# Patient Record
Sex: MALE | Race: WHITE | NOT HISPANIC OR LATINO | Employment: FULL TIME | URBAN - METROPOLITAN AREA
[De-identification: names, ages, dates, MRNs, and addresses within clinical notes are randomized per-mention and may not be internally consistent; named-entity substitution may affect disease eponyms.]

---

## 2017-06-22 ENCOUNTER — APPOINTMENT (OUTPATIENT)
Dept: URGENT CARE | Facility: MEDICAL CENTER | Age: 67
End: 2017-06-22

## 2017-06-22 ENCOUNTER — APPOINTMENT (OUTPATIENT)
Dept: LAB | Facility: MEDICAL CENTER | Age: 67
End: 2017-06-22

## 2017-06-22 ENCOUNTER — TRANSCRIBE ORDERS (OUTPATIENT)
Dept: URGENT CARE | Facility: MEDICAL CENTER | Age: 67
End: 2017-06-22

## 2017-06-22 DIAGNOSIS — Z02.1 PRE-EMPLOYMENT HEALTH SCREENING EXAMINATION: ICD-10-CM

## 2017-06-22 DIAGNOSIS — Z02.1 PRE-EMPLOYMENT HEALTH SCREENING EXAMINATION: Primary | ICD-10-CM

## 2017-06-22 LAB — RUBV IGG SERPL IA-ACNC: >175 IU/ML

## 2017-06-22 PROCEDURE — 86480 TB TEST CELL IMMUN MEASURE: CPT

## 2017-06-22 PROCEDURE — 86787 VARICELLA-ZOSTER ANTIBODY: CPT

## 2017-06-22 PROCEDURE — 86765 RUBEOLA ANTIBODY: CPT

## 2017-06-22 PROCEDURE — 86735 MUMPS ANTIBODY: CPT

## 2017-06-22 PROCEDURE — 36415 COLL VENOUS BLD VENIPUNCTURE: CPT

## 2017-06-22 PROCEDURE — 86762 RUBELLA ANTIBODY: CPT

## 2017-06-24 LAB
ANNOTATION COMMENT IMP: ABNORMAL
GAMMA INTERFERON BACKGROUND BLD IA-ACNC: 0.1 IU/ML
M TB IFN-G BLD-IMP: POSITIVE
M TB IFN-G CD4+ BCKGRND COR BLD-ACNC: >10 IU/ML
M TB IFN-G CD4+ T-CELLS BLD-ACNC: >10 IU/ML
MITOGEN IGNF BLD-ACNC: 3.76 IU/ML
QUANTIFERON-TB GOLD IN TUBE: NORMAL
SERVICE CMNT-IMP: ABNORMAL

## 2017-06-27 LAB
MEV IGG SER QL: NORMAL
MUV IGG SER QL: NORMAL
VZV IGG SER IA-ACNC: NORMAL

## 2017-06-29 ENCOUNTER — TRANSCRIBE ORDERS (OUTPATIENT)
Dept: ADMINISTRATIVE | Facility: HOSPITAL | Age: 67
End: 2017-06-29

## 2017-06-29 ENCOUNTER — APPOINTMENT (OUTPATIENT)
Dept: RADIOLOGY | Facility: MEDICAL CENTER | Age: 67
End: 2017-06-29

## 2017-06-29 DIAGNOSIS — R76.11 POSITIVE TB TEST: ICD-10-CM

## 2017-06-29 DIAGNOSIS — R76.11 POSITIVE TB TEST: Primary | ICD-10-CM

## 2017-06-29 PROCEDURE — 71010 HB CHEST X-RAY 1 VIEW FRONTAL: CPT

## 2018-05-08 ENCOUNTER — HOSPITAL ENCOUNTER (EMERGENCY)
Facility: HOSPITAL | Age: 68
Discharge: HOME | End: 2018-05-08
Attending: EMERGENCY MEDICINE | Admitting: EMERGENCY MEDICINE
Payer: COMMERCIAL

## 2018-05-08 ENCOUNTER — APPOINTMENT (EMERGENCY)
Dept: CT IMAGING | Facility: HOSPITAL | Age: 68
End: 2018-05-08
Payer: COMMERCIAL

## 2018-05-08 VITALS
TEMPERATURE: 98.1 F | WEIGHT: 177 LBS | BODY MASS INDEX: 29.45 KG/M2 | DIASTOLIC BLOOD PRESSURE: 89 MMHG | RESPIRATION RATE: 18 BRPM | OXYGEN SATURATION: 96 % | SYSTOLIC BLOOD PRESSURE: 185 MMHG | HEART RATE: 70 BPM

## 2018-05-08 DIAGNOSIS — R10.9 RIGHT FLANK PAIN: Primary | ICD-10-CM

## 2018-05-08 DIAGNOSIS — N23 RENAL COLIC ON RIGHT SIDE: ICD-10-CM

## 2018-05-08 LAB
ALBUMIN SERPL BCP-MCNC: 4 G/DL (ref 3.5–5)
ALP SERPL-CCNC: 84 U/L (ref 46–116)
ALT SERPL W P-5'-P-CCNC: 27 U/L (ref 12–78)
ANION GAP SERPL CALCULATED.3IONS-SCNC: 8 MMOL/L (ref 4–13)
APTT PPP: 27 SECONDS (ref 23–35)
AST SERPL W P-5'-P-CCNC: 19 U/L (ref 5–45)
BACTERIA UR QL AUTO: ABNORMAL /HPF
BASOPHILS # BLD AUTO: 0.02 THOUSANDS/ΜL (ref 0–0.1)
BASOPHILS NFR BLD AUTO: 0 % (ref 0–1)
BILIRUB SERPL-MCNC: 0.98 MG/DL (ref 0.2–1)
BILIRUB UR QL STRIP: ABNORMAL
BUN SERPL-MCNC: 21 MG/DL (ref 5–25)
CALCIUM SERPL-MCNC: 9 MG/DL (ref 8.3–10.1)
CHLORIDE SERPL-SCNC: 102 MMOL/L (ref 100–108)
CLARITY UR: ABNORMAL
CO2 SERPL-SCNC: 28 MMOL/L (ref 21–32)
COLOR UR: ABNORMAL
CREAT SERPL-MCNC: 1.37 MG/DL (ref 0.6–1.3)
EOSINOPHIL # BLD AUTO: 0.11 THOUSAND/ΜL (ref 0–0.61)
EOSINOPHIL NFR BLD AUTO: 1 % (ref 0–6)
ERYTHROCYTE [DISTWIDTH] IN BLOOD BY AUTOMATED COUNT: 13.8 % (ref 11.6–15.1)
GFR SERPL CREATININE-BSD FRML MDRD: 53 ML/MIN/1.73SQ M
GLUCOSE SERPL-MCNC: 112 MG/DL (ref 65–140)
GLUCOSE UR STRIP-MCNC: NEGATIVE MG/DL
HCT VFR BLD AUTO: 39.8 % (ref 36.5–49.3)
HGB BLD-MCNC: 14.1 G/DL (ref 12–17)
HGB UR QL STRIP.AUTO: ABNORMAL
INR PPP: 0.99 (ref 0.86–1.16)
KETONES UR STRIP-MCNC: NEGATIVE MG/DL
LEUKOCYTE ESTERASE UR QL STRIP: NEGATIVE
LYMPHOCYTES # BLD AUTO: 1.72 THOUSANDS/ΜL (ref 0.6–4.47)
LYMPHOCYTES NFR BLD AUTO: 18 % (ref 14–44)
MCH RBC QN AUTO: 32.7 PG (ref 26.8–34.3)
MCHC RBC AUTO-ENTMCNC: 35.4 G/DL (ref 31.4–37.4)
MCV RBC AUTO: 92 FL (ref 82–98)
MONOCYTES # BLD AUTO: 0.73 THOUSAND/ΜL (ref 0.17–1.22)
MONOCYTES NFR BLD AUTO: 8 % (ref 4–12)
NEUTROPHILS # BLD AUTO: 6.99 THOUSANDS/ΜL (ref 1.85–7.62)
NEUTS SEG NFR BLD AUTO: 73 % (ref 43–75)
NITRITE UR QL STRIP: NEGATIVE
NON-SQ EPI CELLS URNS QL MICRO: ABNORMAL /HPF
NRBC BLD AUTO-RTO: 0 /100 WBCS
PH UR STRIP.AUTO: 5.5 [PH] (ref 4.5–8)
PLATELET # BLD AUTO: 151 THOUSANDS/UL (ref 149–390)
PMV BLD AUTO: 10.8 FL (ref 8.9–12.7)
POTASSIUM SERPL-SCNC: 3.2 MMOL/L (ref 3.5–5.3)
PROT SERPL-MCNC: 7.5 G/DL (ref 6.4–8.2)
PROT UR STRIP-MCNC: ABNORMAL MG/DL
PROTHROMBIN TIME: 13.1 SECONDS (ref 12.1–14.4)
RBC # BLD AUTO: 4.31 MILLION/UL (ref 3.88–5.62)
RBC #/AREA URNS AUTO: ABNORMAL /HPF
SODIUM SERPL-SCNC: 138 MMOL/L (ref 136–145)
SP GR UR STRIP.AUTO: >=1.03 (ref 1–1.03)
UROBILINOGEN UR QL STRIP.AUTO: 0.2 E.U./DL
WBC # BLD AUTO: 9.57 THOUSAND/UL (ref 4.31–10.16)
WBC #/AREA URNS AUTO: ABNORMAL /HPF

## 2018-05-08 PROCEDURE — 85025 COMPLETE CBC W/AUTO DIFF WBC: CPT | Performed by: EMERGENCY MEDICINE

## 2018-05-08 PROCEDURE — 36415 COLL VENOUS BLD VENIPUNCTURE: CPT | Performed by: EMERGENCY MEDICINE

## 2018-05-08 PROCEDURE — 74178 CT ABD&PLV WO CNTR FLWD CNTR: CPT

## 2018-05-08 PROCEDURE — 99284 EMERGENCY DEPT VISIT MOD MDM: CPT

## 2018-05-08 PROCEDURE — 96361 HYDRATE IV INFUSION ADD-ON: CPT

## 2018-05-08 PROCEDURE — 85730 THROMBOPLASTIN TIME PARTIAL: CPT | Performed by: EMERGENCY MEDICINE

## 2018-05-08 PROCEDURE — 80053 COMPREHEN METABOLIC PANEL: CPT | Performed by: EMERGENCY MEDICINE

## 2018-05-08 PROCEDURE — 81001 URINALYSIS AUTO W/SCOPE: CPT | Performed by: EMERGENCY MEDICINE

## 2018-05-08 PROCEDURE — 85610 PROTHROMBIN TIME: CPT | Performed by: EMERGENCY MEDICINE

## 2018-05-08 PROCEDURE — 96375 TX/PRO/DX INJ NEW DRUG ADDON: CPT

## 2018-05-08 PROCEDURE — 96374 THER/PROPH/DIAG INJ IV PUSH: CPT

## 2018-05-08 RX ORDER — AMLODIPINE BESYLATE 5 MG/1
5 TABLET ORAL 2 TIMES DAILY
COMMUNITY

## 2018-05-08 RX ORDER — EPLERENONE 50 MG/1
100 TABLET, FILM COATED ORAL 2 TIMES DAILY
COMMUNITY

## 2018-05-08 RX ORDER — KETOROLAC TROMETHAMINE 30 MG/ML
15 INJECTION, SOLUTION INTRAMUSCULAR; INTRAVENOUS ONCE
Status: COMPLETED | OUTPATIENT
Start: 2018-05-08 | End: 2018-05-08

## 2018-05-08 RX ORDER — ASPIRIN 81 MG/1
81 TABLET ORAL DAILY
COMMUNITY

## 2018-05-08 RX ORDER — DOXAZOSIN MESYLATE 4 MG/1
4 TABLET ORAL
COMMUNITY

## 2018-05-08 RX ORDER — ONDANSETRON 2 MG/ML
4 INJECTION INTRAMUSCULAR; INTRAVENOUS ONCE
Status: COMPLETED | OUTPATIENT
Start: 2018-05-08 | End: 2018-05-08

## 2018-05-08 RX ADMIN — IODIXANOL 100 ML: 320 INJECTION, SOLUTION INTRAVASCULAR at 22:45

## 2018-05-08 RX ADMIN — KETOROLAC TROMETHAMINE 15 MG: 30 INJECTION, SOLUTION INTRAMUSCULAR at 21:56

## 2018-05-08 RX ADMIN — ONDANSETRON 4 MG: 2 INJECTION INTRAMUSCULAR; INTRAVENOUS at 21:56

## 2018-05-08 RX ADMIN — SODIUM CHLORIDE 1000 ML: 0.9 INJECTION, SOLUTION INTRAVENOUS at 22:01

## 2018-05-09 ENCOUNTER — OFFICE VISIT (OUTPATIENT)
Dept: UROLOGY | Facility: MEDICAL CENTER | Age: 68
End: 2018-05-09
Payer: COMMERCIAL

## 2018-05-09 VITALS
DIASTOLIC BLOOD PRESSURE: 90 MMHG | BODY MASS INDEX: 29.73 KG/M2 | SYSTOLIC BLOOD PRESSURE: 148 MMHG | WEIGHT: 185 LBS | HEIGHT: 66 IN

## 2018-05-09 DIAGNOSIS — N20.0 CALCULUS OF KIDNEY: Primary | ICD-10-CM

## 2018-05-09 DIAGNOSIS — N20.1 CALCULUS OF URETER: Primary | ICD-10-CM

## 2018-05-09 DIAGNOSIS — Z01.810 PRE-OPERATIVE CARDIOVASCULAR EXAMINATION: ICD-10-CM

## 2018-05-09 LAB
SL AMB  POCT GLUCOSE, UA: ABNORMAL
SL AMB LEUKOCYTE ESTERASE,UA: ABNORMAL
SL AMB POCT BILIRUBIN,UA: ABNORMAL
SL AMB POCT BLOOD,UA: ABNORMAL
SL AMB POCT CLARITY,UA: CLEAR
SL AMB POCT COLOR,UA: YELLOW
SL AMB POCT KETONES,UA: ABNORMAL
SL AMB POCT NITRITE,UA: ABNORMAL
SL AMB POCT PH,UA: 6.5
SL AMB POCT SPECIFIC GRAVITY,UA: 1.02
SL AMB POCT URINE PROTEIN: ABNORMAL
SL AMB POCT UROBILINOGEN: 0.2

## 2018-05-09 PROCEDURE — 81003 URINALYSIS AUTO W/O SCOPE: CPT | Performed by: UROLOGY

## 2018-05-09 PROCEDURE — 99204 OFFICE O/P NEW MOD 45 MIN: CPT | Performed by: UROLOGY

## 2018-05-09 RX ORDER — CELECOXIB 200 MG/1
CAPSULE ORAL
Status: ON HOLD | COMMUNITY
End: 2018-05-11 | Stop reason: ALTCHOICE

## 2018-05-09 RX ORDER — COLCHICINE 0.6 MG/1
TABLET, FILM COATED ORAL
Refills: 0 | COMMUNITY
Start: 2018-03-04

## 2018-05-09 RX ORDER — UBIDECARENONE 30 MG
CAPSULE ORAL
COMMUNITY
End: 2018-05-09 | Stop reason: SDUPTHER

## 2018-05-09 RX ORDER — OLOPATADINE HYDROCHLORIDE 2 MG/ML
SOLUTION/ DROPS OPHTHALMIC
Status: ON HOLD | COMMUNITY
End: 2018-05-11 | Stop reason: ALTCHOICE

## 2018-05-09 RX ORDER — CYCLOSPORINE 0.5 MG/ML
EMULSION OPHTHALMIC
Status: ON HOLD | COMMUNITY
End: 2018-05-11 | Stop reason: ALTCHOICE

## 2018-05-09 RX ORDER — HYDROCHLOROTHIAZIDE 12.5 MG/1
TABLET ORAL
Status: ON HOLD | COMMUNITY
End: 2018-05-11 | Stop reason: ALTCHOICE

## 2018-05-09 RX ORDER — FLUTICASONE PROPIONATE 50 MCG
1 SPRAY, SUSPENSION (ML) NASAL
COMMUNITY
Start: 2014-03-27

## 2018-05-09 RX ORDER — CHLORAL HYDRATE 500 MG
CAPSULE ORAL
Status: ON HOLD | COMMUNITY
End: 2018-05-11 | Stop reason: ALTCHOICE

## 2018-05-09 NOTE — DISCHARGE INSTRUCTIONS
Renal Colic   WHAT YOU NEED TO KNOW:   Renal colic is severe pain in your lower back or sides  The pain is usually on one side, but may be on both sides of your lower back  Renal colic may start quickly, come and go, and become worse over time  Renal colic is caused by a blockage in your urinary tract  The most common cause of a blockage is a kidney stone  Blood clots, ureter spasms, and dead tissue may also block your urinary tract  DISCHARGE INSTRUCTIONS:   Return to the emergency department if:   · You cannot stop vomiting  · You see new or increased bleeding when you urinate  · You are urinating less than usual, or not at all  · Your pain is not getting better even after treatment  Contact your healthcare provider if:   · You have fever  · You need to urinate more often than usual, or right away  · You see a stone in your urine strainer after you urinate  · You have questions or concerns about your condition or care  Medicines:   · Medicines  may help decrease pain and muscle spasms  You may also need medicine to calm your stomach and stop vomiting  · Take your medicine as directed  Contact your healthcare provider if you think your medicine is not helping or if you have side effects  Tell him of her if you are allergic to any medicine  Keep a list of the medicines, vitamins, and herbs you take  Include the amounts, and when and why you take them  Bring the list or the pill bottles to follow-up visits  Carry your medicine list with you in case of an emergency  Manage your symptoms:   · Drink liquids as directed  to help decrease pain and flush blockages from your urinary tract  Ask how much liquid to drink each day and which liquids are best for you  You may need to drink about 3 liters (12 glasses) of liquids each day  Half of your total daily liquids should be water  Limit coffee, tea, and soda to 2 cups daily  Your urine should be pale and clear      · Strain your urine every time you urinate  Urinate into a strainer (funnel with a fine mesh on the bottom) or glass jar to collect kidney stones  Give the kidney stones to your healthcare provider at your next visit  · Eat a variety of healthy foods  Healthy foods include fruits, vegetables, whole-grain breads, low-fat dairy products, beans, lean meats, and fish  You may need to increase the amount of citrus fruit you eat, such as oranges  Ask your healthcare provider how much salt, calcium, and protein you should eat  · Avoid activity in hot temperatures  Heat may cause you to become dehydrated and urinate less  Follow up with your healthcare provider as directed: You may need to return for tests to check if your blockage has cleared  Write down your questions so you remember to ask them during your visits  © 2017 [employer] Information is for End User's use only and may not be sold, redistributed or otherwise used for commercial purposes  All illustrations and images included in CareNotes® are the copyrighted property of A [employer] M [employer]  or Roel Greenberg  The above information is an  only  It is not intended as medical advice for individual conditions or treatments  Talk to your doctor, nurse or pharmacist before following any medical regimen to see if it is safe and effective for you

## 2018-05-09 NOTE — ED PROVIDER NOTES
History  Chief Complaint   Patient presents with    Flank Pain     Patient presents complaining of hematuria, abdominal discomfort, nausea, and right sided flank pain thats been worsening since yesterday  Reports spoke with Dr Katelynn Aguilar prior to arrival and was instructed to come in for evaluation  76 yo male who presents with R flank pain, nausea, abd "bloating" and "fullness" and gross hematuria  Per pt he woke up "just not feeling good" yesterday and felt nauseas throughout the day, noted some blood in urine  Persisted this am and mid day began with R flank pain which has persisted and worsened  Pt friends with Dr Benny Espinosa who sent him in  History provided by:  Patient   used: No    Flank Pain   Pain location:  R flank  Pain quality: aching    Pain radiates to:  Does not radiate  Pain severity:  Mild  Onset quality:  Gradual  Duration:  6 hours  Timing:  Constant  Progression:  Worsening  Chronicity:  New  Relieved by:  Nothing  Worsened by:  Nothing  Ineffective treatments:  None tried  Associated symptoms: hematuria (since yesterday) and nausea (since yesterday)    Associated symptoms: no belching, no chest pain, no chills, no constipation, no diarrhea, no dysuria, no fever, no shortness of breath, no sore throat and no vomiting    Risk factors: aspirin    Risk factors: has not had multiple surgeries        Prior to Admission Medications   Prescriptions Last Dose Informant Patient Reported? Taking?    Multiple Vitamins-Minerals (CENTRUM SILVER PO)   Yes Yes   Sig: Take by mouth daily   amLODIPine (NORVASC) 5 mg tablet   Yes Yes   Sig: Take 5 mg by mouth 2 (two) times a day   aspirin (ECOTRIN LOW STRENGTH) 81 mg EC tablet   Yes Yes   Sig: Take 81 mg by mouth daily   doxazosin (CARDURA) 4 mg tablet   Yes Yes   Sig: Take 4 mg by mouth daily at bedtime   eplerenone (INSPRA) 50 MG tablet   Yes Yes   Sig: Take 100 mg by mouth 2 (two) times a day      Facility-Administered Medications: None       Past Medical History:   Diagnosis Date    [facility]oma ([facility] [address] )     Prostate cancer ([facility] [address] )        Past Surgical History:   Procedure Laterality Date    ROTATOR CUFF REPAIR         History reviewed  No pertinent family history  I have reviewed and agree with the history as documented  Social History   Substance Use Topics    Smoking status: Never Smoker    Smokeless tobacco: Never Used    Alcohol use Yes      Comment: Socially        Review of Systems   Constitutional: Negative for appetite change, chills and fever  "I just don't feel good"   HENT: Negative for congestion and sore throat  Eyes: Negative for visual disturbance  Respiratory: Negative for shortness of breath and wheezing  Cardiovascular: Negative for chest pain and palpitations  Gastrointestinal: Positive for abdominal pain (feels full and bloated) and nausea (since yesterday)  Negative for constipation, diarrhea and vomiting  Genitourinary: Positive for flank pain (R sided) and hematuria (since yesterday)  Negative for decreased urine volume and dysuria  Musculoskeletal: Negative for neck pain and neck stiffness  Skin: Negative for pallor and rash  Neurological: Negative for headaches  Psychiatric/Behavioral: Negative for confusion  All other systems reviewed and are negative        Physical Exam  ED Triage Vitals   Temperature Pulse Respirations Blood Pressure SpO2   05/08/18 2114 05/08/18 2113 05/08/18 2113 05/08/18 2113 05/08/18 2113   98 1 °F (36 7 °C) 63 18 (!) 175/77 97 %      Temp Source Heart Rate Source Patient Position - Orthostatic VS BP Location FiO2 (%)   05/08/18 2114 05/08/18 2113 05/08/18 2113 05/08/18 2113 --   Oral Monitor Sitting Right arm       Pain Score       05/08/18 2113       6           Orthostatic Vital Signs  Vitals:    05/08/18 2113 05/08/18 2320   BP: (!) 175/77 (!) 185/89   Pulse: 63 70   Patient Position - Orthostatic VS: Sitting Lying       Physical Exam Constitutional: He is oriented to person, place, and time  He appears well-developed and well-nourished  No distress  HENT:   Head: Normocephalic and atraumatic  Mouth/Throat: Oropharynx is clear and moist    Neck: Neck supple  Cardiovascular: Normal rate and regular rhythm  No murmur heard  Pulmonary/Chest: Effort normal and breath sounds normal    Abdominal: Soft  Bowel sounds are normal  He exhibits no distension and no mass  There is no tenderness  There is no rebound  Musculoskeletal: Normal range of motion  He exhibits no edema  No CVA tenderness   Neurological: He is alert and oriented to person, place, and time  Skin: Skin is warm  Capillary refill takes less than 2 seconds  No rash noted  No pallor  Psychiatric: He has a normal mood and affect  His behavior is normal    Nursing note and vitals reviewed        ED Medications  Medications   sodium chloride 0 9 % bolus 1,000 mL (0 mL Intravenous Stopped 5/8/18 2320)   ketorolac (TORADOL) injection 15 mg (15 mg Intravenous Given 5/8/18 2156)   ondansetron (ZOFRAN) injection 4 mg (4 mg Intravenous Given 5/8/18 2156)   iodixanol (VISIPAQUE) 320 MG/ML injection 100 mL (100 mL Intravenous Given 5/8/18 2245)       Diagnostic Studies  Results Reviewed     Procedure Component Value Units Date/Time    Comprehensive metabolic panel [[id number]]  (Abnormal) Collected:  05/08/18 2153    Lab Status:  Final result Specimen:  Blood from Arm, Right Updated:  05/08/18 2237     Sodium 138 mmol/L      Potassium 3 2 (L) mmol/L      Chloride 102 mmol/L      CO2 28 mmol/L      Anion Gap 8 mmol/L      BUN 21 mg/dL      Creatinine 1 37 (H) mg/dL      Glucose 112 mg/dL      Calcium 9 0 mg/dL      AST 19 U/L      ALT 27 U/L      Alkaline Phosphatase 84 U/L      Total Protein 7 5 g/dL      Albumin 4 0 g/dL      Total Bilirubin 0 98 mg/dL      eGFR 53 ml/min/1 73sq m     Narrative:         National Kidney Disease Education Program recommendations are as follows:  GFR calculation is accurate only with a steady state creatinine  Chronic Kidney disease less than 60 ml/min/1 73 sq  meters  Kidney failure less than 15 ml/min/1 73 sq  meters      Protime-INR [[id number]]  (Normal) Collected:  05/08/18 2153    Lab Status:  Final result Specimen:  Blood from Arm, Right Updated:  05/08/18 2228     Protime 13 1 seconds      INR 0 99    APTT [[id number]]  (Normal) Collected:  05/08/18 2153    Lab Status:  Final result Specimen:  Blood from Arm, Right Updated:  05/08/18 2228     PTT 27 seconds     CBC and differential [[id number]] Collected:  05/08/18 2153    Lab Status:  Final result Specimen:  Blood from Arm, Right Updated:  05/08/18 2208     WBC 9 57 Thousand/uL      RBC 4 31 Million/uL      Hemoglobin 14 1 g/dL      Hematocrit 39 8 %      MCV 92 fL      MCH 32 7 pg      MCHC 35 4 g/dL      RDW 13 8 %      MPV 10 8 fL      Platelets 297 Thousands/uL      nRBC 0 /100 WBCs      Neutrophils Relative 73 %      Lymphocytes Relative 18 %      Monocytes Relative 8 %      Eosinophils Relative 1 %      Basophils Relative 0 %      Neutrophils Absolute 6 99 Thousands/µL      Lymphocytes Absolute 1 72 Thousands/µL      Monocytes Absolute 0 73 Thousand/µL      Eosinophils Absolute 0 11 Thousand/µL      Basophils Absolute 0 02 Thousands/µL     Urine Microscopic [[id number]]  (Abnormal) Collected:  05/08/18 2145    Lab Status:  Final result Specimen:  Urine from Urine, Clean Catch Updated:  05/08/18 2206     RBC, UA Innumerable (A) /hpf      WBC, UA None Seen /hpf      Epithelial Cells Occasional /hpf      Bacteria, UA Occasional /hpf     UA w Reflex to Microscopic w Reflex to Culture [[id number]]  (Abnormal) Collected:  05/08/18 2145    Lab Status:  Final result Specimen:  Urine from Urine, Clean Catch Updated:  05/08/18 2202     Color, UA Red     Clarity, UA Cloudy     Specific Gravity, UA >=1 030     pH, UA 5 5     Leukocytes, UA Negative     Nitrite, UA Negative     Protein,  (2+) (A) mg/dl Glucose, UA Negative mg/dl      Ketones, UA Negative mg/dl      Urobilinogen, UA 0 2 E U /dl      Bilirubin, UA Interference- unable to analyze (A)     Blood, UA Large (A)                 CT renal protocol   Final Result by Lorenzo Mcmillan MD (05/08 2318)      6 mm partially obstructing stone in the proximal right ureter resulting in hydroureteronephrosis  Additionally, a nonobstructing 4 mm stone is seen in the lower pole collecting system of the left kidney  Mild bilateral symmetric prominence of the    ureters and symmetric circumferential bladder wall thickening likely sequela of prior prostatic hypertrophy  Patient is post prostatectomy  No lytic or blastic bone lesions are seen  No invasion of the bladder  No CT evidence for prostatic metastatic    disease  Bilateral adrenal adenomas which patient states he has had for some time  Examination was discussed with the patient at the time of interpretation  Workstation performed: [id number]                    Procedures  Procedures       Phone Contacts  ED Phone Contact    ED Course  ED Course as of May 09 0102   Tue May 08, 2018   2136 Pt seen and examined  76 yo male who presents with R flank pain, nausea, abd "bloating" and "fullness" and gross hematuria  Per pt he woke up "just not feeling good" yesterday and felt nauseas throughout the day, noted some blood in urine  Persisted this am and mid day began with R flank pain which has persisted and worsened  Pt friends with urologist Dr Shari Hogan who would prefer CT renal protocol with and without IV contrast due to family hx of kidney CA  Will give IVF, toradol 15mg and zofran and check labs, CT     2211 U/a large blood, 100 (2+) protein, no nitrates or leuks  CBC WNL  2248 Creat 1 37, GFR 53, IVF infusing - pt getting CT scan      2326 CT a/p - 6 mm partially obstructing stone in the proximal right ureter resulting in hydroureteronephrosis   Additionally, a nonobstructing 4 mm stone is seen in the lower pole collecting system of the left kidney   Mild bilateral symmetric prominence of the   ureters and symmetric circumferential bladder wall thickening likely sequela of prior prostatic hypertrophy   Patient is post prostatectomy   No lytic or blastic bone lesions are seen   No invasion of the bladder   No CT evidence for prostatic metastatic disease  Bilateral adrenal adenomas which patient states he has had for some time  56 Pt feels great, has appt with Dr Shari Hogan in am and wants to go  [facility]  CritCare Time    Disposition  Final diagnoses:   Right flank pain   Renal colic on right side     Time reflects when diagnosis was documented in both MDM as applicable and the Disposition within this note     Time User Action Codes Description Comment    5/8/2018 11:32 PM Hernandezpatthuan SPENCE Add [R10 9] Right flank pain     5/8/2018 11:32 PM Thais Nicholas Add [K83] Renal colic on right side       ED Disposition     ED Disposition Condition Comment    Discharge  Peace Garcia discharge to home/self care  Condition at discharge: Good        Follow-up Information     Follow up With Specialties Details Why Contact Info    Angelica Lui MD Urology  tomorrow as scheduled [address] [place] [address]  [phone]          Discharge Medication List as of 5/8/2018 11:32 PM      CONTINUE these medications which have NOT CHANGED    Details   amLODIPine (NORVASC) 5 mg tablet Take 5 mg by mouth 2 (two) times a day, Historical Med      aspirin (ECOTRIN LOW STRENGTH) 81 mg EC tablet Take 81 mg by mouth daily, Historical Med      doxazosin (CARDURA) 4 mg tablet Take 4 mg by mouth daily at bedtime, Historical Med      eplerenone (INSPRA) 50 MG tablet Take 100 mg by mouth 2 (two) times a day, Historical Med      Multiple Vitamins-Minerals (CENTRUM SILVER PO) Take by mouth daily, Historical Med           No discharge procedures on file      ED Provider  Electronically Signed by           Isabell Bruce DO  05/09/18 6127

## 2018-05-09 NOTE — PROGRESS NOTES
Assessment/Plan:  6 mm stone has a very low chance of passing all the way  Options discussed, we could follow this conservatively for a while to see if the stone did passed down further  However I think it is not likely he would ever be able to avoid procedure  We discussed shockwave lithotripsy versus ureteroscopy and laser with stent  Overall much higher success a getting him stone free will one procedure with ureteroscopy  He like to go and schedule  He knows that if he remains pain free until the time of the procedure that we could do a KUB on admission to see if the stone is still there,  potential complications discussed he gives informed consent  No problem-specific Assessment & Plan notes found for this encounter  Diagnoses and all orders for this visit:    Calculus of kidney  -     POCT urine dip auto non-scope    Other orders  -     cycloSPORINE (RESTASIS) 0 05 % ophthalmic emulsion; Restasis 0 05 % eye drops in a dropperette  -     olopatadine HCl (PATADAY) 0 2 % opth drops; INSTILL 1 DROP INTO BOTH EYES AT BEDTIME  -     Discontinue: Multiple Vitamins-Minerals (MULTI FOR HIM) TABS; Take by mouth  -     hydrochlorothiazide (HYDRODIURIL) 12 5 mg tablet; hydrochlorothiazide 12 5 mg tablet  -     fluticasone (FLONASE) 50 mcg/act nasal spray; 1 spray into each nostril  -     Omega-3 Fatty Acids (FISH OIL) 1,000 mg; Take by mouth  -     COLCRYS 0 6 MG tablet; TAKE 2 TABS NOW, THEN TAKE 1 TAB ONE HOUR LATER THEN TAKE 1 TAB TWICE A DAY, START 3/5/18 X 10 DAYS  -     celecoxib (CELEBREX) 200 mg capsule; TAKE 1 CAPSULE ONCE DAILY          Subjective:      Patient ID: Kelsie Bustamante is a 75 y o  male  Onset gross hematuria several days ago  The next day bloating and then generalized abdominal comfort and right low back discomfort  ER visit last night, CT scan shows a proximal right ureteral stone, 6 mm, mild hydronephrosis  This morning pain-free no symptoms whatsoever    No prior history of stones  The following portions of the patient's history were reviewed and updated as appropriate: allergies, current medications, past family history, past medical history, past social history, past surgical history and problem list     Review of Systems   All other systems reviewed and are negative  Objective:      /90   Ht 5' 6" (1 676 m)   Wt 83 9 kg (185 lb)   BMI 29 86 kg/m²          Physical Exam   Constitutional: He is oriented to person, place, and time  He appears well-developed and well-nourished  No distress  HENT:   Head: Normocephalic and atraumatic  Eyes: Conjunctivae are normal    Cardiovascular: Normal rate and regular rhythm  Pulmonary/Chest: Effort normal and breath sounds normal  No respiratory distress  He has no wheezes  Abdominal: Soft  Bowel sounds are normal  He exhibits no distension and no mass  There is no tenderness  Genitourinary: Testes normal and penis normal    Neurological: He is alert and oriented to person, place, and time  Skin: Skin is warm and dry   He is not diaphoretic      prostate not examined, PSA very low    CT scan films reviewed with patient

## 2018-05-09 NOTE — ED NOTES
Patient transported to [facility] [address], [address] [place] [address]  05/08/18 1668

## 2018-05-09 NOTE — LETTER
May 9, 2018     [address], [address]  [address] [facility] [address]    Patient: Uriel Toro   YOB: 1950   Date of Visit: 5/9/2018       Dear Dr Arnoldo Oropeza: Thank you for referring Uriel Toro to me for evaluation  Below are my notes for this consultation  If you have questions, please do not hesitate to call me  I look forward to following your patient along with you  Sincerely,        Giovanni Georges MD        CC: No Recipients  Giovanni Georges MD  5/9/2018  5:25 PM  Sign at close encounter  Assessment/Plan:  6 mm stone has a very low chance of passing all the way  Options discussed, we could follow this conservatively for a while to see if the stone did passed down further  However I think it is not likely he would ever be able to avoid procedure  We discussed shockwave lithotripsy versus ureteroscopy and laser with stent  Overall much higher success a getting him stone free will one procedure with ureteroscopy  He like to go and schedule  He knows that if he remains pain free until the time of the procedure that we could do a KUB on admission to see if the stone is still there,  potential complications discussed he gives informed consent  No problem-specific Assessment & Plan notes found for this encounter  Diagnoses and all orders for this visit:    Calculus of kidney  -     POCT urine dip auto non-scope    Other orders  -     cycloSPORINE (RESTASIS) 0 05 % ophthalmic emulsion; Restasis 0 05 % eye drops in a dropperette  -     olopatadine HCl (PATADAY) 0 2 % opth drops; INSTILL 1 DROP INTO BOTH EYES AT BEDTIME  -     Discontinue: Multiple Vitamins-Minerals (MULTI FOR HIM) TABS; Take by mouth  -     hydrochlorothiazide (HYDRODIURIL) 12 5 mg tablet; hydrochlorothiazide 12 5 mg tablet  -     fluticasone (FLONASE) 50 mcg/act nasal spray; 1 spray into each nostril  -     Omega-3 Fatty Acids (FISH OIL) 1,000 mg;  Take by mouth  - COLCRYS 0 6 MG tablet; TAKE 2 TABS NOW, THEN TAKE 1 TAB ONE HOUR LATER THEN TAKE 1 TAB TWICE A DAY, START 3/5/18 X 10 DAYS  -     celecoxib (CELEBREX) 200 mg capsule; TAKE 1 CAPSULE ONCE DAILY          Subjective:      Patient ID: Angel Mcgovern is a 75 y o  male  Onset gross hematuria several days ago  The next day bloating and then generalized abdominal comfort and right low back discomfort  ER visit last night, CT scan shows a proximal right ureteral stone, 6 mm, mild hydronephrosis  This morning pain-free no symptoms whatsoever  No prior history of stones  The following portions of the patient's history were reviewed and updated as appropriate: allergies, current medications, past family history, past medical history, past social history, past surgical history and problem list     Review of Systems   All other systems reviewed and are negative  Objective:      /90   Ht 5' 6" (1 676 m)   Wt 83 9 kg (185 lb)   BMI 29 86 kg/m²           Physical Exam   Constitutional: He is oriented to person, place, and time  He appears well-developed and well-nourished  No distress  HENT:   Head: Normocephalic and atraumatic  Eyes: Conjunctivae are normal    Cardiovascular: Normal rate and regular rhythm  Pulmonary/Chest: Effort normal and breath sounds normal  No respiratory distress  He has no wheezes  Abdominal: Soft  Bowel sounds are normal  He exhibits no distension and no mass  There is no tenderness  Genitourinary: Testes normal and penis normal    Neurological: He is alert and oriented to person, place, and time  Skin: Skin is warm and dry   He is not diaphoretic      prostate not examined, PSA very low    CT scan films reviewed with patient

## 2018-05-10 ENCOUNTER — ANESTHESIA EVENT (OUTPATIENT)
Dept: PERIOP | Facility: HOSPITAL | Age: 68
End: 2018-05-10
Payer: COMMERCIAL

## 2018-05-10 NOTE — H&P
HISTORY AND PHYSICAL  ? ? Patient Name: Mel Chau  Patient MRN: [id number]  Attending Provider: Celine Estrella MD  Service: Urology  Chief Complaint    Right upper ureteral calculus      PAVAN Chau is a 75 y o  male with  several days of gross hematuria, flank pain  CT shows 6 mm stone right upper ureter about 3 cm below the UPJ  I plan  cysto right ureteroscopy laser stone stent placement     Potential risks and complications discussed, and informed consent was given by the patient  Medications  Meds/Allergies     No current facility-administered medications for this encounter  Cannot display prior to admission medications because the patient has not been admitted in this contact  No current facility-administered medications for this encounter       Current Outpatient Prescriptions:     amLODIPine (NORVASC) 5 mg tablet, Take 5 mg by mouth 2 (two) times a day, Disp: , Rfl:     aspirin (ECOTRIN LOW STRENGTH) 81 mg EC tablet, Take 81 mg by mouth daily, Disp: , Rfl:     celecoxib (CELEBREX) 200 mg capsule, TAKE 1 CAPSULE ONCE DAILY, Disp: , Rfl:     COLCRYS 0 6 MG tablet, TAKE 2 TABS NOW, THEN TAKE 1 TAB ONE HOUR LATER THEN TAKE 1 TAB TWICE A DAY, START 3/5/18 X 10 DAYS, Disp: , Rfl: 0    cycloSPORINE (RESTASIS) 0 05 % ophthalmic emulsion, Restasis 0 05 % eye drops in a dropperette, Disp: , Rfl:     doxazosin (CARDURA) 4 mg tablet, Take 4 mg by mouth daily at bedtime, Disp: , Rfl:     eplerenone (INSPRA) 50 MG tablet, Take 100 mg by mouth 2 (two) times a day, Disp: , Rfl:     fluticasone (FLONASE) 50 mcg/act nasal spray, 1 spray into each nostril, Disp: , Rfl:     hydrochlorothiazide (HYDRODIURIL) 12 5 mg tablet, hydrochlorothiazide 12 5 mg tablet, Disp: , Rfl:     Multiple Vitamins-Minerals (CENTRUM SILVER PO), Take by mouth daily, Disp: , Rfl:     olopatadine HCl (PATADAY) 0 2 % opth drops, INSTILL 1 DROP INTO BOTH EYES AT BEDTIME, Disp: , Rfl:    Omega-3 Fatty Acids (FISH OIL) 1,000 mg, Take by mouth, Disp: , Rfl:   Review of Systems  10 point review of systems negative except as noted in HPI  Allergies  No Known Allergies  PMH  Past Medical History:   Diagnosis Date    Melanoma ([facility] )     Prostate cancer ([facility] )      Past surgical history  Past Surgical History:   Procedure Laterality Date    ROTATOR CUFF REPAIR       Social history  Social History   Substance Use Topics    Smoking status: Never Smoker    Smokeless tobacco: Never Used    Alcohol use Yes      Comment: Socially     ?   Physical Exam  General appearance: Mild back and flank discomfort from right ureteral stone  Head: Normocephalic, without obvious abnormality, atraumatic  Neck: no adenopathy, no carotid bruit, no JVD, supple, symmetrical, trachea midline and thyroid not enlarged, symmetric, no tenderness/mass/nodules  Lungs: clear to auscultation bilaterally  Heart: regular rate and rhythm, S1, S2 normal, no murmur, click, rub or gallop  Abdomen: soft, non-tender; bowel sounds normal; no masses,  no organomegaly  Male genitalia: normal  Extremities: extremities normal, warm and well-perfused; no cyanosis, clubbing, or edema  Lymph nodes: Cervical, supraclavicular, and axillary nodes normal   Neurologic: Grossly normal  Radha Vaughn MD

## 2018-05-11 ENCOUNTER — ANESTHESIA (OUTPATIENT)
Dept: PERIOP | Facility: HOSPITAL | Age: 68
End: 2018-05-11
Payer: COMMERCIAL

## 2018-05-11 ENCOUNTER — APPOINTMENT (OUTPATIENT)
Dept: RADIOLOGY | Facility: HOSPITAL | Age: 68
End: 2018-05-11
Payer: COMMERCIAL

## 2018-05-11 ENCOUNTER — HOSPITAL ENCOUNTER (OUTPATIENT)
Facility: HOSPITAL | Age: 68
Setting detail: OUTPATIENT SURGERY
Discharge: HOME | End: 2018-05-11
Attending: UROLOGY | Admitting: UROLOGY
Payer: COMMERCIAL

## 2018-05-11 ENCOUNTER — TELEPHONE (OUTPATIENT)
Dept: UROLOGY | Facility: AMBULATORY SURGERY CENTER | Age: 68
End: 2018-05-11

## 2018-05-11 VITALS
OXYGEN SATURATION: 95 % | BODY MASS INDEX: 29.73 KG/M2 | HEIGHT: 66 IN | WEIGHT: 185 LBS | DIASTOLIC BLOOD PRESSURE: 60 MMHG | TEMPERATURE: 97.6 F | RESPIRATION RATE: 18 BRPM | HEART RATE: 49 BPM | SYSTOLIC BLOOD PRESSURE: 127 MMHG

## 2018-05-11 DIAGNOSIS — N20.1 URETERAL STONE: ICD-10-CM

## 2018-05-11 PROCEDURE — 52356 CYSTO/URETERO W/LITHOTRIPSY: CPT | Performed by: UROLOGY

## 2018-05-11 PROCEDURE — C2625 STENT, NON-COR, TEM W/DEL SY: HCPCS | Performed by: UROLOGY

## 2018-05-11 PROCEDURE — C1769 GUIDE WIRE: HCPCS | Performed by: UROLOGY

## 2018-05-11 PROCEDURE — 74450 X-RAY URETHRA/BLADDER: CPT

## 2018-05-11 PROCEDURE — C1726 CATH, BAL DIL, NON-VASCULAR: HCPCS | Performed by: UROLOGY

## 2018-05-11 DEVICE — STENT KWART RETRO INJECT SET 6FR 145CM: Type: IMPLANTABLE DEVICE | Status: FUNCTIONAL

## 2018-05-11 RX ORDER — MAGNESIUM HYDROXIDE 1200 MG/15ML
LIQUID ORAL AS NEEDED
Status: DISCONTINUED | OUTPATIENT
Start: 2018-05-11 | End: 2018-05-11 | Stop reason: HOSPADM

## 2018-05-11 RX ORDER — ONDANSETRON 2 MG/ML
4 INJECTION INTRAMUSCULAR; INTRAVENOUS ONCE
Status: DISCONTINUED | OUTPATIENT
Start: 2018-05-11 | End: 2018-05-11 | Stop reason: HOSPADM

## 2018-05-11 RX ORDER — MIDAZOLAM HYDROCHLORIDE 1 MG/ML
INJECTION INTRAMUSCULAR; INTRAVENOUS AS NEEDED
Status: DISCONTINUED | OUTPATIENT
Start: 2018-05-11 | End: 2018-05-11 | Stop reason: SURG

## 2018-05-11 RX ORDER — CEFAZOLIN SODIUM 1 G/3ML
INJECTION, POWDER, FOR SOLUTION INTRAMUSCULAR; INTRAVENOUS AS NEEDED
Status: DISCONTINUED | OUTPATIENT
Start: 2018-05-11 | End: 2018-05-11 | Stop reason: SURG

## 2018-05-11 RX ORDER — ROCURONIUM BROMIDE 10 MG/ML
INJECTION, SOLUTION INTRAVENOUS AS NEEDED
Status: DISCONTINUED | OUTPATIENT
Start: 2018-05-11 | End: 2018-05-11 | Stop reason: SURG

## 2018-05-11 RX ORDER — FENTANYL CITRATE/PF 50 MCG/ML
25 SYRINGE (ML) INJECTION
Status: DISCONTINUED | OUTPATIENT
Start: 2018-05-11 | End: 2018-05-11 | Stop reason: HOSPADM

## 2018-05-11 RX ORDER — ONDANSETRON 2 MG/ML
INJECTION INTRAMUSCULAR; INTRAVENOUS AS NEEDED
Status: DISCONTINUED | OUTPATIENT
Start: 2018-05-11 | End: 2018-05-11 | Stop reason: SURG

## 2018-05-11 RX ORDER — MORPHINE SULFATE 10 MG/ML
4 INJECTION, SOLUTION INTRAMUSCULAR; INTRAVENOUS EVERY 2 HOUR PRN
Status: DISCONTINUED | OUTPATIENT
Start: 2018-05-11 | End: 2018-05-11 | Stop reason: HOSPADM

## 2018-05-11 RX ORDER — ACETAMINOPHEN 325 MG/1
650 TABLET ORAL EVERY 6 HOURS PRN
Status: DISCONTINUED | OUTPATIENT
Start: 2018-05-11 | End: 2018-05-11 | Stop reason: HOSPADM

## 2018-05-11 RX ORDER — GLYCOPYRROLATE 0.2 MG/ML
INJECTION INTRAMUSCULAR; INTRAVENOUS AS NEEDED
Status: DISCONTINUED | OUTPATIENT
Start: 2018-05-11 | End: 2018-05-11 | Stop reason: SURG

## 2018-05-11 RX ORDER — HYDROCODONE BITARTRATE AND ACETAMINOPHEN 5; 325 MG/1; MG/1
1-2 TABLET ORAL EVERY 6 HOURS PRN
Qty: 20 TABLET | Refills: 0 | Status: SHIPPED | OUTPATIENT
Start: 2018-05-11 | End: 2018-05-21

## 2018-05-11 RX ORDER — CEPHALEXIN 500 MG/1
500 CAPSULE ORAL EVERY 12 HOURS SCHEDULED
Qty: 10 CAPSULE | Refills: 0 | Status: SHIPPED | OUTPATIENT
Start: 2018-05-11 | End: 2018-05-16

## 2018-05-11 RX ORDER — OXYCODONE HYDROCHLORIDE AND ACETAMINOPHEN 5; 325 MG/1; MG/1
2 TABLET ORAL EVERY 4 HOURS PRN
Status: DISCONTINUED | OUTPATIENT
Start: 2018-05-11 | End: 2018-05-11 | Stop reason: HOSPADM

## 2018-05-11 RX ORDER — OXYCODONE HYDROCHLORIDE AND ACETAMINOPHEN 5; 325 MG/1; MG/1
1 TABLET ORAL EVERY 4 HOURS PRN
Status: DISCONTINUED | OUTPATIENT
Start: 2018-05-11 | End: 2018-05-11 | Stop reason: HOSPADM

## 2018-05-11 RX ORDER — FENTANYL CITRATE 50 UG/ML
INJECTION, SOLUTION INTRAMUSCULAR; INTRAVENOUS AS NEEDED
Status: DISCONTINUED | OUTPATIENT
Start: 2018-05-11 | End: 2018-05-11 | Stop reason: SURG

## 2018-05-11 RX ORDER — PROPOFOL 10 MG/ML
INJECTION, EMULSION INTRAVENOUS AS NEEDED
Status: DISCONTINUED | OUTPATIENT
Start: 2018-05-11 | End: 2018-05-11 | Stop reason: SURG

## 2018-05-11 RX ORDER — SODIUM CHLORIDE 9 MG/ML
125 INJECTION, SOLUTION INTRAVENOUS CONTINUOUS
Status: DISCONTINUED | OUTPATIENT
Start: 2018-05-11 | End: 2018-05-11 | Stop reason: HOSPADM

## 2018-05-11 RX ADMIN — FENTANYL CITRATE 50 MCG: 50 INJECTION, SOLUTION INTRAMUSCULAR; INTRAVENOUS at 07:46

## 2018-05-11 RX ADMIN — ONDANSETRON HYDROCHLORIDE 4 MG: 2 INJECTION, SOLUTION INTRAVENOUS at 08:28

## 2018-05-11 RX ADMIN — GLYCOPYRROLATE 0.4 MG: 0.2 INJECTION, SOLUTION INTRAMUSCULAR; INTRAVENOUS at 08:41

## 2018-05-11 RX ADMIN — SODIUM CHLORIDE: 0.9 INJECTION, SOLUTION INTRAVENOUS at 08:42

## 2018-05-11 RX ADMIN — DEXAMETHASONE SODIUM PHOSPHATE 8 MG: 10 INJECTION INTRAMUSCULAR; INTRAVENOUS at 07:48

## 2018-05-11 RX ADMIN — ROCURONIUM BROMIDE 40 MG: 10 INJECTION INTRAVENOUS at 07:37

## 2018-05-11 RX ADMIN — SODIUM CHLORIDE 125 ML/HR: 0.9 INJECTION, SOLUTION INTRAVENOUS at 06:17

## 2018-05-11 RX ADMIN — MIDAZOLAM HYDROCHLORIDE 2 MG: 1 INJECTION, SOLUTION INTRAMUSCULAR; INTRAVENOUS at 07:27

## 2018-05-11 RX ADMIN — NEOSTIGMINE METHYLSULFATE 3 MG: 1 INJECTION, SOLUTION INTRAMUSCULAR; INTRAVENOUS; SUBCUTANEOUS at 08:41

## 2018-05-11 RX ADMIN — FENTANYL CITRATE 50 MCG: 50 INJECTION, SOLUTION INTRAMUSCULAR; INTRAVENOUS at 07:34

## 2018-05-11 RX ADMIN — PROPOFOL 200 MG: 10 INJECTION, EMULSION INTRAVENOUS at 07:37

## 2018-05-11 RX ADMIN — CEFAZOLIN 2000 MG: 1 INJECTION, POWDER, FOR SOLUTION INTRAVENOUS at 07:41

## 2018-05-11 NOTE — ANESTHESIA PREPROCEDURE EVALUATION
Review of Systems/Medical History  Patient summary reviewed  Chart reviewed  No history of anesthetic complications     Cardiovascular  Hypertension controlled,    Pulmonary  Negative pulmonary ROS   Comment: Seasonal allergies     GI/Hepatic  Negative GI/hepatic ROS          Prostatic disorder,        Endo/Other  Negative endo/other ROS      GYN  Negative gynecology ROS          Hematology  Negative hematology ROS      Musculoskeletal  Negative musculoskeletal ROS        Neurology  Negative neurology ROS      Psychology   Negative psychology ROS              Physical Exam    Airway    Mallampati score: II  TM Distance: >3 FB  Neck ROM: full     Dental   No notable dental hx     Cardiovascular  Rhythm: regular, Rate: normal, Cardiovascular exam normal    Pulmonary  Pulmonary exam normal Breath sounds clear to auscultation,     Other Findings        Anesthesia Plan  ASA Score- 2     Anesthesia Type- general with ASA Monitors  Additional Monitors:   Airway Plan:         Plan Factors- Patient instructed to abstain from smoking on day of procedure  Patient did not smoke on day of surgery  Induction- intravenous  Postoperative Plan- Plan for postoperative opioid use  Planned trial extubation    Informed Consent- Anesthetic plan and risks discussed with patient

## 2018-05-11 NOTE — DISCHARGE INSTRUCTIONS
Cystoscopy   WHAT YOU NEED TO KNOW:   A cystoscopy is a procedure to look inside of your urethra and bladder using a cystoscope  A cystoscope is a small tube with a light and magnifying camera on the end  The procedure is used to diagnose and treat conditions of the bladder, urethra, and prostate  The procedure is also done to remove stones or blood clots from the urethra or bladder  Your healthcare provider may do other tests, such as ureteroscopy, during a cystoscopy  DISCHARGE INSTRUCTIONS:   Call 911 if:   · You suddenly have chest pain or trouble breathing  Seek care immediately if:   · Your urine turns from pink to red, or you have clots in your urine  · You cannot urinate and your bladder feels full  · Your pain or burning becomes worse or lasts longer than 2 days  Contact your healthcare provider or urologist if:   · Your urine stays pink for longer than 3 days  · You urinate less than normal, or still feel like you have to urinate after you use the bathroom  · Your skin is itchy, swollen, or has a new rash  · You have a fever and chills  · You have questions or concerns about your condition or care  Medicines: You may  be given any of the following:  · Antibiotics  help treat or prevent a bacterial infection  · Acetaminophen  decreases pain and fever  It is available without a doctor's order  Ask how much to take and how often to take it  Follow directions  Read the labels of all other medicines you are using to see if they also contain acetaminophen, or ask your doctor or pharmacist  Acetaminophen can cause liver damage if not taken correctly  Do not use more than 4 grams (4,000 milligrams) total of acetaminophen in one day  · Take your medicine as directed  Contact your healthcare provider if you think your medicine is not helping or if you have side effects  Tell him or her if you are allergic to any medicine   Keep a list of the medicines, vitamins, and herbs you take  Include the amounts, and when and why you take them  Bring the list or the pill bottles to follow-up visits  Carry your medicine list with you in case of an emergency  Follow up with your healthcare provider as directed: You may need to have another cystoscopy  Write down your questions so you remember to ask them during your visits  Self-care:   · Drink at least 3 to 4 glasses of water daily for 2 days after your procedure  Do not drink acidic juices such as orange juice and lemonade  Drink water to help prevent blood clots from forming  It can also help decrease the amount of acid in your urine  Acid in your urine may increase the burning feeling when you urinate  · Sit in a warm tub of water  Warm water may relieve pain and bladder spasms  · Do not have sex  until your healthcare provider tells you it is okay  Sex may increase your risk for a urinary tract infection  © 2017 [facility] Information is for End User's use only and may not be sold, redistributed or otherwise used for commercial purposes  All illustrations and images included in CareNotes® are the copyrighted property of [employer]  or [employer]  The above information is an  only  It is not intended as medical advice for individual conditions or treatments  Talk to your doctor, nurse or pharmacist before following any medical regimen to see if it is safe and effective for you  Urethral Stent Placement   WHAT YOU NEED TO KNOW:   Urethral stent placement is a procedure to open a blockage or stricture (narrowing) of your urethra  The urethra is the tube that carries urine from your bladder out of your body  A stent is a small plastic or metal tube used to open your narrowed urethra  A urethral stent may stay in for a short or long period of time  DISCHARGE INSTRUCTIONS:   Medicines:   · Antibiotics:   This medicine is given to help treat or prevent an infection caused by bacteria  · Pain medicine: You may be given a prescription medicine to decrease pain  Do not wait until the pain is severe before you take this medicine  · Take your medicine as directed  Contact your healthcare provider if you think your medicine is not helping or if you have side effects  Tell him or her if you are allergic to any medicine  Keep a list of the medicines, vitamins, and herbs you take  Include the amounts, and when and why you take them  Bring the list or the pill bottles to follow-up visits  Carry your medicine list with you in case of an emergency  Follow up with your healthcare provider or urologist as directed: You may need more tests or procedures  Write down your questions so you remember to ask them during your visits  Activity:  Ask when it is okay for you to return to work or school, have sex, or do your usual activities  Contact your healthcare provider or urologist if:   · You have pain in your lower abdomen  · You feel like you need to urinate more often than usual     · You have pain in the area between your anus and your genitals  · You have pain with an erection  · You have pain during or after sex  · You have questions or concerns about your condition or care  Seek care immediately or call 911 if:   · You have a fever and chills  · You have blood or discharge in your urine  · You have severe pain between your ribs and hips or in your lower back  · You have trouble urinating, or pain when you urinate  © 2017 [address] Information is for End User's use only and may not be sold, redistributed or otherwise used for commercial purposes  All illustrations and images included in CareNotes® are the copyrighted property of [employer]  or Roel Greenberg  The above information is an  only  It is not intended as medical advice for individual conditions or treatments   Talk to your doctor, nurse or pharmacist before following any medical regimen to see if it is safe and effective for you

## 2018-05-11 NOTE — DISCHARGE INSTR - AVS FIRST PAGE
ALL YOUR  PREVIOUS MEDS ARE THE SAME  NEW MEDS:  Cephalexin antibiotic twice per day for five days,  hydrocodone if needed for pain    BE CAREFUL NOT TO PULL THE STRING  EXPECT SOME BLOOD IN URINE, BURNING, FREQUENT URINATION

## 2018-05-11 NOTE — DISCHARGE SUMMARY
Discharge Summary - Ana Maria Galvan 75 y o  male MRN: [id number]    Admission Date: 5/11/2018     Admitting Diagnosis: Ureteral stone [N20 1]    Procedures Performed:   Right ureteroscop, laser stone, stent     Patient underwent planned outpt surgery and recovered without complication  Discharged in good condition  Medications were prescribed  Pt knows to call the office for followup in five days

## 2018-05-11 NOTE — OP NOTE
OPERATIVE REPORT  PATIENT NAME: Avery Lu    :  1950  MRN: [id number]  Pt Location: [facility] OR ROOM 05    SURGERY DATE: 2018    Surgeon(s) and Role:     * Aster French MD - Primary    Preop Diagnosis:  Ureteral stone [N20 1]    Post-Op Diagnosis Codes:     * Ureteral stone [N20 1]    Procedure(s) (LRB):  CYSTOSCOPY URETEROSCOPY WITH LITHOTRIPSY HOLMIUM LASER, RETROGRADE PYELOGRAM AND INSERTION STENT URETERAL (Right)    Specimen(s):  * No specimens in log *    Estimated Blood Loss:   Minimal    Drains:  Ureteral Drain/Stent Right ureter 6 Fr  (Active)   Number of days: 0       Anesthesia Type:   General    Operative Indications:  Ureteral stone [N20 1]      Operative Findings:  6 mm stone upper right ureter, ureters somewhat tortuous below it    Complications:   None    Procedure and Technique:    PLAN FOR STENT:  I will remove it in five days with string     The patient was brought into the OR, properly identified and positioned on the table  General anesthesia was induced, and he was prepped using betadine solution and draped in lithotomy position  The 22F scope was introduced in the urethra, which showed no strictures  The prostate had minimal hyperplasia, having undergone radiation therapy in the past   The prostatic urethra and bladder neck were somewhat elongated, with the right orifice right next to the bladder neck    The bladder was inspected and had no lesions, stones, or tumors, mild trabeculation, residual urine about 150 mL  The right ureteral orifice was identified contrast injected up the ureter  The lower ureter was quite tortuous, the stone visible in the upper ureter  Two wires were placed up the ureter  The flexible scope was placed thru a sheath and advanced toward the mid ureter, the torturous aspect of the ureter made  placing the scope difficult    A wire was placed through the scope up to the renal pelvis, the scope was advanced over the wire carefully through the tortuous area of the ureter  The upper ureter was somewhat inflamed from the stone having been there  At this point the stone had flushed up into the kidney, and the scope was passed up into the kidney  Inspection showed the stone present in a lateral calyx of the kidney  The holmium laser was used on various settings to break the stone up into numerous small particles  All particles were small enough to pass around the stent  Minimal bleeding was seen from the calyx  Care was taken not to laser kidney tissue  The scope was removed from the ureter, and the cystoscope was used to place a 6F stent in the ureter, with the upper coils in the renal pelvis, and the distal coil in the bladder  Retrograde pyelogram on that side confirmed good position and no extravasation of contrast    The patient was awaken from anesthesia and taken to the PACU in good condition        I was present for the entire procedure    Patient Disposition:  PACU     SIGNATURE: Gokul Rollins MD  DATE: May 11, 2018  TIME: 8:55 AM

## 2018-05-11 NOTE — TELEPHONE ENCOUNTER
Spoke with wife, patient is in excruciating pain and the medication is not working  She would like a call back [facility] away  [phone] is the number she gave me   Thank you

## 2018-05-11 NOTE — PROGRESS NOTES
D/C instructions reviewed w/ pt & spouse, verbalized understanding  Stent string remains taped to top of penis, pt reminded to be careful w/ bathing not to pull string  Given supplies to strain urine for a few day per Dr Be Noguera  Rx filled at [facility]  Denies pain  Scant amount bloody drainage noted at tip penis at voiding, given gauze to place in underwear  PO fluids encouraged

## 2018-05-16 ENCOUNTER — OFFICE VISIT (OUTPATIENT)
Dept: UROLOGY | Facility: MEDICAL CENTER | Age: 68
End: 2018-05-16
Payer: COMMERCIAL

## 2018-05-16 VITALS
HEIGHT: 66 IN | WEIGHT: 185 LBS | BODY MASS INDEX: 29.73 KG/M2 | DIASTOLIC BLOOD PRESSURE: 74 MMHG | SYSTOLIC BLOOD PRESSURE: 139 MMHG

## 2018-05-16 DIAGNOSIS — N20.1 CALCULUS OF URETER: Primary | ICD-10-CM

## 2018-05-16 LAB
SL AMB  POCT GLUCOSE, UA: ABNORMAL
SL AMB LEUKOCYTE ESTERASE,UA: ABNORMAL
SL AMB POCT BILIRUBIN,UA: ABNORMAL
SL AMB POCT BLOOD,UA: ABNORMAL
SL AMB POCT CLARITY,UA: CLEAR
SL AMB POCT COLOR,UA: YELLOW
SL AMB POCT KETONES,UA: ABNORMAL
SL AMB POCT NITRITE,UA: POSITIVE
SL AMB POCT PH,UA: 5.5
SL AMB POCT SPECIFIC GRAVITY,UA: 1.02
SL AMB POCT URINE PROTEIN: 300
SL AMB POCT UROBILINOGEN: 1

## 2018-05-16 PROCEDURE — 81003 URINALYSIS AUTO W/O SCOPE: CPT | Performed by: UROLOGY

## 2018-05-16 PROCEDURE — 99212 OFFICE O/P EST SF 10 MIN: CPT | Performed by: UROLOGY

## 2018-05-16 RX ORDER — HYDROCHLOROTHIAZIDE 25 MG/1
TABLET ORAL
COMMUNITY
Start: 2008-09-24

## 2018-05-16 RX ORDER — ATENOLOL 50 MG/1
TABLET ORAL
COMMUNITY
Start: 2008-09-24

## 2018-05-16 RX ORDER — AMLODIPINE BESYLATE 5 MG/1
TABLET ORAL
COMMUNITY
Start: 2008-09-24

## 2018-05-16 RX ORDER — ONDANSETRON 4 MG/1
4 TABLET, FILM COATED ORAL EVERY 6 HOURS PRN
Refills: 0 | COMMUNITY
Start: 2018-05-11

## 2018-05-16 RX ORDER — KETOROLAC TROMETHAMINE 10 MG/1
TABLET, FILM COATED ORAL
Refills: 0 | COMMUNITY
Start: 2018-05-11

## 2018-05-16 RX ORDER — MULTIVIT WITH MINERALS/LUTEIN
TABLET ORAL
COMMUNITY

## 2018-05-16 NOTE — LETTER
May 16, 2018     [place], [place] [address] [address]    Patient: Yane Shepherd   YOB: 1950   Date of Visit: 5/16/2018       Dear Dr Chris De La Garza: Thank you for referring Yane Shepherd to me for evaluation  Below are my notes for this consultation  If you have questions, please do not hesitate to call me  I look forward to following your patient along with you  Sincerely,        Bruna Hicks MD        CC: No Recipients  Bruna Hicks MD  5/16/2018  4:05 PM  Sign at close encounter  Doing well now, 1 week op from right ureteroscopy for 6 mm upper ureteral stone  Had a tortuous ureter, probably at least part of the reason the stone did not pass  He had terrible pain the first day of this procedure, much better since that time  Mild gross hematuria, getting better    I removed the stent  Discussed if he should have a stone workup, we decided just to have him void oxalate rich foods, add lemon juice  CT scan showed 4 mm stone in lower pole left kidney  We discussed if he should have that preventive Clarisa treated with lithotripsy, he will let me know

## 2018-05-16 NOTE — PROGRESS NOTES
KEFLEX 500MG WAS GIVEN TO PT  PRIOR TO CYSTO PROCEDURE    [facility]#[id number]  EXP: 08/2019   NDC# 6321-5748-55

## 2018-05-16 NOTE — PROGRESS NOTES
Doing well now, 1 week op from right ureteroscopy for 6 mm upper ureteral stone  Had a tortuous ureter, probably at least part of the reason the stone did not pass  He had terrible pain the first day of this procedure, much better since that time  Mild gross hematuria, getting better    I removed the stent  Discussed if he should have a stone workup, we decided just to have him void oxalate rich foods, add lemon juice  CT scan showed 4 mm stone in lower pole left kidney  We discussed if he should have that preventive Clarisa treated with lithotripsy, he will let me know
